# Patient Record
Sex: FEMALE | Race: WHITE | NOT HISPANIC OR LATINO | Employment: FULL TIME | URBAN - METROPOLITAN AREA
[De-identification: names, ages, dates, MRNs, and addresses within clinical notes are randomized per-mention and may not be internally consistent; named-entity substitution may affect disease eponyms.]

---

## 2023-01-14 ENCOUNTER — HOSPITAL ENCOUNTER (EMERGENCY)
Facility: CLINIC | Age: 28
Discharge: HOME | End: 2023-01-14
Attending: EMERGENCY MEDICINE | Admitting: EMERGENCY MEDICINE
Payer: COMMERCIAL

## 2023-01-14 ENCOUNTER — APPOINTMENT (OUTPATIENT)
Dept: GENERAL RADIOLOGY | Facility: CLINIC | Age: 28
End: 2023-01-14
Attending: EMERGENCY MEDICINE
Payer: COMMERCIAL

## 2023-01-14 VITALS
SYSTOLIC BLOOD PRESSURE: 129 MMHG | BODY MASS INDEX: 20.99 KG/M2 | HEART RATE: 100 BPM | OXYGEN SATURATION: 99 % | DIASTOLIC BLOOD PRESSURE: 83 MMHG | HEIGHT: 72 IN | WEIGHT: 155 LBS | TEMPERATURE: 99.4 F

## 2023-01-14 DIAGNOSIS — S49.91XA SHOULDER INJURY, RIGHT, INITIAL ENCOUNTER: ICD-10-CM

## 2023-01-14 DIAGNOSIS — S46.001A ROTATOR CUFF INJURY, RIGHT, INITIAL ENCOUNTER: ICD-10-CM

## 2023-01-14 PROCEDURE — 99284 EMERGENCY DEPT VISIT MOD MDM: CPT

## 2023-01-14 PROCEDURE — 73000 X-RAY EXAM OF COLLAR BONE: CPT | Mod: RT

## 2023-01-14 PROCEDURE — 73030 X-RAY EXAM OF SHOULDER: CPT | Mod: RT

## 2023-01-14 PROCEDURE — 250N000013 HC RX MED GY IP 250 OP 250 PS 637: Performed by: EMERGENCY MEDICINE

## 2023-01-14 RX ORDER — IBUPROFEN 600 MG/1
600 TABLET, FILM COATED ORAL ONCE
Status: COMPLETED | OUTPATIENT
Start: 2023-01-14 | End: 2023-01-14

## 2023-01-14 RX ORDER — ACETAMINOPHEN 325 MG/1
650 TABLET ORAL ONCE
Status: COMPLETED | OUTPATIENT
Start: 2023-01-14 | End: 2023-01-14

## 2023-01-14 RX ADMIN — IBUPROFEN 600 MG: 600 TABLET ORAL at 21:34

## 2023-01-14 RX ADMIN — ACETAMINOPHEN 650 MG: 325 TABLET, FILM COATED ORAL at 21:34

## 2023-01-14 ASSESSMENT — ENCOUNTER SYMPTOMS
ARTHRALGIAS: 1
HEADACHES: 0
NECK PAIN: 0
NECK STIFFNESS: 0

## 2023-01-15 NOTE — ED NOTES
Rapid Assessment Note    History:   Faviola Cancino is a 27 year old female who presents with a right shoulder injury. The patient states she was playing hockey today when she fell and landed on her right shoulder. She denies head injury or loss of consciousness. She endorses right shoulder pain. She denies pain or difficulty moving her right elbow or wrist. She is able to move her right digits. She denies neck injury or pain. She denies previous surgery to her shoulder.     Exam:   Resp:               Non-labored  Neuro:             Alert and cooperative  MSkel:             Tenderness lateral clavicle and shoulder joint, no distal tenderness in arm.  Good ROM elbow, wrist, hand.  Full neck ROM.  Skin:                Swelling around R shoulder, no obvious open wound    Plan of Care:   I evaluated the patient and developed an initial plan of care. I discussed this plan and explained that I, or one of my partners, would be returning to complete the evaluation.     I, Laurel Sims, am serving as a scribe to document services personally performed by Atiya Montalvo MD, based on my observations and the provider's statements to me.    1/14/2023  [facility]    Portions of this medical record were completed by a scribe. UPON MY REVIEW AND AUTHENTICATION BY ELECTRONIC SIGNATURE, this confirms (a) I performed the applicable clinical services, and (b) the record is accurate.      Atiya Montalvo MD  01/14/23 2120

## 2023-01-15 NOTE — DISCHARGE INSTRUCTIONS
Prescription strength dosing instructions:  - 600mg ibuprofen (Advil, Motrin) every 6 hours as needed for fever/pain/inflammation.  Naprosyn (Naproxen, Aleve) works similarly and can be used instead, if preferred.  - 650mg acetaminophen (tylenol) every 4-6 hours or 1000mg every 6-8 hours (maximum of 3000mg in 24 hours).  Acetaminophen is often in combination over the counter and prescription pain medications.  Be sure to include this in daily total.    These medications work differently and can be used in combination.      Do range of motion exercised as discussed

## 2023-01-15 NOTE — ED TRIAGE NOTES
Patient here with right shoulder injury . She stated she fell playing hockey today. She denies hitting her head and  No LOC     Triage Assessment     Row Name 01/14/23 2022       Triage Assessment (Adult)    Airway WDL WDL       Respiratory WDL    Respiratory WDL WDL       Skin Circulation/Temperature WDL    Skin Circulation/Temperature WDL WDL       Cardiac WDL    Cardiac WDL WDL       Peripheral/Neurovascular WDL    Peripheral Neurovascular WDL WDL       Cognitive/Neuro/Behavioral WDL    Cognitive/Neuro/Behavioral WDL WDL

## 2023-01-15 NOTE — ED PROVIDER NOTES
History   Chief Complaint:  Shoulder Injury     The history is provided by the patient and medical records.      Faviola Cancino is a 27 year old female who presents with  a right shoulder injury. The patient states she was playing hockey today when she fell and landed on the top of her right shoulder. She denies head injury or loss of consciousness. She endorses right shoulder pain. She denies pain or difficulty moving her right elbow or wrist. She is able to move her right digits. She denies neck injury or pain. She denies previous surgery to her shoulder.  Right handed    Review of Systems   Musculoskeletal: Positive for arthralgias (right shoulder). Negative for neck pain and neck stiffness.   Neurological: Negative for syncope and headaches.   All other systems reviewed and are negative.    Allergies:  Amoxicillin     Medications:    The patient is not currently taking any prescribed medications.    Past Medical History:    The patient denies past medical history.     Social History:  The patient was accompanied to the emergency department by a friend.  PCP: No Ref-Primary, Physician     Physical Exam     Patient Vitals for the past 24 hrs:   BP Temp Temp src Pulse SpO2 Height Weight   01/14/23 2018 129/83 99.4  F (37.4  C) Oral 100 99 % 1.829 m (6') 70.3 kg (155 lb)      Physical Exam  Resp:               Non-labored  Neuro:             Alert and cooperative  MSkel:             Tenderness lateral clavicle and shoulder joint, no distal tenderness in arm.  Good ROM elbow, wrist, hand.  Full neck ROM.  Skin:                Swelling around R shoulder, no obvious open wound    Reassessed after x-ray.  Most limitation is on forward flexion at shoulder.  Can abduct almost to horizontal.  Can place hand in small of back.  Cannot place behind head.    Emergency Department Course   Imaging:  XR Shoulder Right G/E 3 Views   Final Result   IMPRESSION: Normal joint spaces and alignment. No fracture or dislocation.       Clavicle XR, right   Final Result   IMPRESSION: Normal alignment. No fracture. Normal AC joint.      Report per radiology    Emergency Department Course & Assessments:     Interventions:  Medications   ibuprofen (ADVIL/MOTRIN) tablet 600 mg (600 mg Oral Given 1/14/23 2134)   acetaminophen (TYLENOL) tablet 650 mg (650 mg Oral Given 1/14/23 2134)      Independent Interpretation (X-rays, CTs, rhythm strip):  I reviewed the patient's right shoulder and clavicle x-ray.      Consultations/Discussion of Management or Tests:  2030 I rapidly assessed the patient and ordered x-ray imaging. See separate rapid assessment note.   2126 I rechecked the patient and explained findings.    Disposition:  The patient was discharged to home.     Impression & Plan    Medical Decision Making:  Primary concerns with her presentation are her fracture and dislocation.  These were not found on my review of the imaging or on the radiology final report.  Additional range of motion testing was done.  She is unable to abduct or flex above horizontal concerning for the possibility of a rotator cuff injury.  Discussed the risks of a sling, specifically increasing the risk of adhesive capsulitis or frozen shoulder.  Demonstrated range of motion exercises including pendulum swings and wall walks.  Can use over-the-counter analgesics.  Follow-up with orthopedics.    Diagnosis:    ICD-10-CM    1. Shoulder injury, right, initial encounter  S49.91XA       2. Rotator cuff injury, right, initial encounter  S46.001A           Scribe Disclosure:  I, Laurel Sims, am serving as a scribe at 9:20 PM on 1/14/2023 to document services personally performed by Atiya Montalvo MD based on my observations and the provider's statements to me.     Atiya Montalvo MD  01/15/23 [id number]